# Patient Record
Sex: MALE | Race: WHITE
[De-identification: names, ages, dates, MRNs, and addresses within clinical notes are randomized per-mention and may not be internally consistent; named-entity substitution may affect disease eponyms.]

---

## 2017-01-18 NOTE — REP
CHEST X-RAY:

 

Two views.

 

HISTORY:  Cough.  Upper respiratory symptoms, tobacco use.  Dizziness.

 

FINDINGS:  The lungs are symmetrically aerated and free of infiltrate.  Pleural

angles are sharp.  Heart is not enlarged.  The aorta is slightly tortuous.  No

significant bony abnormality is seen.

 

IMPRESSION: No active disease.

 

 

Signed by

Darrell Alvarez MD 01/18/2017 08:09 P

## 2017-01-19 NOTE — ECGEPIP
Stationary ECG Study

                              [facility] [place]

                                       

                                       Test Date:    2017

Pat Name:     ROGELIO HEMPHILL            Department:   

Patient ID:   [id number]                 Room:         -

Gender:       M                        Technician:   [facility]

:          1960               Requested By: Noemi AYON

Order Number: [id number]     Reading MD:   Marcelino Stoddard

                                 Measurements

Intervals                              Axis          

Rate:         86                       P:            13

WA:           148                      QRS:          37

QRSD:         77                       T:            72

QT:           365                                    

QTc:          439                                    

                           Interpretive Statements

Normal sinus rhythm

Incomplete right bundle branch block

Delayed anterior R wave progression

Comparison tracing not on file

 

 

Electronically Signed On 2017 8:28:46 EST by Marcelino Stoddard

## 2018-04-02 ENCOUNTER — HOSPITAL ENCOUNTER (OUTPATIENT)
Dept: HOSPITAL 53 - M PT | Age: 58
LOS: 28 days | End: 2018-04-30
Attending: NURSE PRACTITIONER
Payer: MEDICAID

## 2018-04-02 DIAGNOSIS — M54.5: ICD-10-CM

## 2018-04-02 DIAGNOSIS — Z51.89: Primary | ICD-10-CM

## 2018-04-02 PROCEDURE — 97010 HOT OR COLD PACKS THERAPY: CPT

## 2018-04-30 ENCOUNTER — HOSPITAL ENCOUNTER (OUTPATIENT)
Dept: HOSPITAL 53 - M RAD | Age: 58
End: 2018-04-30
Attending: NURSE PRACTITIONER
Payer: MEDICAID

## 2018-04-30 DIAGNOSIS — M54.5: Primary | ICD-10-CM

## 2018-04-30 PROCEDURE — 72148 MRI LUMBAR SPINE W/O DYE: CPT

## 2018-06-13 ENCOUNTER — HOSPITAL ENCOUNTER (OUTPATIENT)
Dept: HOSPITAL 53 - M LAB REF | Age: 58
End: 2018-06-13
Attending: NURSE PRACTITIONER
Payer: COMMERCIAL

## 2018-06-13 DIAGNOSIS — Z13.9: Primary | ICD-10-CM

## 2018-06-13 LAB
ALBUMIN/GLOBULIN RATIO: 0.95 (ref 1–1.93)
ALBUMIN: 3.6 GM/DL (ref 3.2–5.2)
ALKALINE PHOSPHATASE: 101 U/L (ref 45–117)
ALT SERPL W P-5'-P-CCNC: 25 U/L (ref 12–78)
ANION GAP: 9 MEQ/L (ref 8–16)
AST SERPL-CCNC: 16 U/L (ref 7–37)
BASO #: 0.1 10^3/UL (ref 0–0.2)
BASO %: 0.8 % (ref 0–1)
BILIRUBIN,TOTAL: 0.3 MG/DL (ref 0.2–1)
BLOOD UREA NITROGEN: 10 MG/DL (ref 7–18)
CALCIUM LEVEL: 9.6 MG/DL (ref 8.5–10.1)
CARBON DIOXIDE LEVEL: 30 MEQ/L (ref 21–32)
CHLORIDE LEVEL: 105 MEQ/L (ref 98–107)
CHOLEST SERPL-MCNC: 225 MG/DL (ref ?–200)
CHOLESTEROL RISK RATIO: 5.11 (ref ?–5)
CREATININE FOR GFR: 0.94 MG/DL (ref 0.7–1.3)
EOS #: 0.1 10^3/UL (ref 0–0.5)
EOSINOPHIL NFR BLD AUTO: 1.6 % (ref 0–3)
EST. AVERAGE GLUCOSE BLD GHB EST-MCNC: 108 MG/DL (ref 60–110)
GFR SERPL CREATININE-BSD FRML MDRD: > 60 ML/MIN/{1.73_M2} (ref 56–?)
GLUCOSE, FASTING: 87 MG/DL (ref 70–100)
HDLC SERPL-MCNC: 44 MG/DL (ref 40–?)
HEMATOCRIT: 47.8 % (ref 42–52)
HEMOGLOBIN: 16.2 G/DL (ref 13.5–17.5)
IMMATURE GRANULOCYTE %: 0.3 % (ref 0–3)
LDL CHOLESTEROL: 144.4 MG/DL (ref ?–100)
LYMPH #: 2.5 10^3/UL (ref 1.5–4.5)
LYMPH %: 28.8 % (ref 24–44)
MEAN CORPUSCULAR HEMOGLOBIN: 31.3 PG (ref 27–33)
MEAN CORPUSCULAR HGB CONC: 33.9 G/DL (ref 32–36.5)
MEAN CORPUSCULAR VOLUME: 92.3 FL (ref 80–96)
MONO #: 1 10^3/UL (ref 0–0.8)
MONO %: 11.5 % (ref 0–5)
NEUTROPHILS #: 5 10^3/UL (ref 1.8–7.7)
NEUTROPHILS %: 57 % (ref 36–66)
NONHDLC SERPL-MCNC: 181 MG/DL
NRBC BLD AUTO-RTO: 0 % (ref 0–0)
PLATELET COUNT, AUTOMATED: 277 10^3/UL (ref 150–450)
POTASSIUM SERUM: 4.8 MEQ/L (ref 3.5–5.1)
PSA SERPL-MCNC: 2.24 NG/ML (ref ?–4)
RED BLOOD COUNT: 5.18 10^6/UL (ref 4.3–6.1)
RED CELL DISTRIBUTION WIDTH: 12.1 % (ref 11.5–14.5)
SODIUM LEVEL: 144 MEQ/L (ref 136–145)
TOTAL PROTEIN: 7.4 GM/DL (ref 6.4–8.2)
TRIGLYCERIDES LEVEL: 183 MG/DL (ref ?–150)
WHITE BLOOD COUNT: 8.8 10^3/UL (ref 4–10)

## 2018-06-19 ENCOUNTER — HOSPITAL ENCOUNTER (OUTPATIENT)
Dept: HOSPITAL 53 - M PT | Age: 58
LOS: 11 days | End: 2018-06-30
Attending: NURSE PRACTITIONER
Payer: COMMERCIAL

## 2018-06-19 DIAGNOSIS — Z51.89: Primary | ICD-10-CM

## 2018-06-19 DIAGNOSIS — M54.5: ICD-10-CM

## 2018-09-11 ENCOUNTER — HOSPITAL ENCOUNTER (OUTPATIENT)
Dept: HOSPITAL 53 - M LAB REF | Age: 58
End: 2018-09-11
Attending: NURSE PRACTITIONER
Payer: COMMERCIAL

## 2018-09-11 DIAGNOSIS — E78.5: Primary | ICD-10-CM

## 2018-09-11 LAB
CHOLEST SERPL-MCNC: 149 MG/DL (ref ?–200)
CHOLESTEROL RISK RATIO: 3.73 (ref ?–5)
HDLC SERPL-MCNC: 40 MG/DL (ref 40–?)
LDL CHOLESTEROL: 69 MG/DL (ref ?–100)
NONHDLC SERPL-MCNC: 109 MG/DL
TRIGLYCERIDES LEVEL: 199 MG/DL (ref ?–150)

## 2018-09-11 PROCEDURE — 80061 LIPID PANEL: CPT

## 2018-09-17 ENCOUNTER — HOSPITAL ENCOUNTER (OUTPATIENT)
Dept: HOSPITAL 53 - M LAB REF | Age: 58
End: 2018-09-17
Attending: NURSE PRACTITIONER
Payer: COMMERCIAL

## 2018-09-17 DIAGNOSIS — K30: Primary | ICD-10-CM

## 2018-09-17 DIAGNOSIS — K21.9: ICD-10-CM

## 2020-04-14 ENCOUNTER — HOSPITAL ENCOUNTER (OUTPATIENT)
Dept: HOSPITAL 53 - M LAB REF | Age: 60
End: 2020-04-14
Attending: NURSE PRACTITIONER
Payer: COMMERCIAL

## 2020-04-14 DIAGNOSIS — M54.5: ICD-10-CM

## 2020-04-14 DIAGNOSIS — K30: Primary | ICD-10-CM

## 2020-04-14 DIAGNOSIS — E78.5: ICD-10-CM

## 2020-04-14 DIAGNOSIS — Z12.5: ICD-10-CM

## 2020-04-14 DIAGNOSIS — Z72.0: ICD-10-CM

## 2020-04-14 LAB
25(OH)D3 SERPL-MCNC: 29.4 NG/ML (ref 30–100)
ALBUMIN SERPL BCG-MCNC: 3.7 GM/DL (ref 3.2–5.2)
ALT SERPL W P-5'-P-CCNC: 26 U/L (ref 12–78)
BASOPHILS # BLD AUTO: 0.1 10^3/UL (ref 0–0.2)
BASOPHILS NFR BLD AUTO: 0.5 % (ref 0–1)
BILIRUB SERPL-MCNC: 0.4 MG/DL (ref 0.2–1)
BUN SERPL-MCNC: 7 MG/DL (ref 7–18)
CALCIUM SERPL-MCNC: 9.3 MG/DL (ref 8.5–10.1)
CHLORIDE SERPL-SCNC: 104 MEQ/L (ref 98–107)
CHOLEST SERPL-MCNC: 209 MG/DL (ref ?–200)
CHOLEST/HDLC SERPL: 4.98 {RATIO} (ref ?–5)
CO2 SERPL-SCNC: 30 MEQ/L (ref 21–32)
CREAT SERPL-MCNC: 0.91 MG/DL (ref 0.7–1.3)
EOSINOPHIL # BLD AUTO: 0.1 10^3/UL (ref 0–0.5)
EOSINOPHIL NFR BLD AUTO: 1.5 % (ref 0–3)
EST. AVERAGE GLUCOSE BLD GHB EST-MCNC: 114 MG/DL (ref 60–110)
GFR SERPL CREATININE-BSD FRML MDRD: > 60 ML/MIN/{1.73_M2} (ref 56–?)
GLUCOSE SERPL-MCNC: 88 MG/DL (ref 70–100)
HCT VFR BLD AUTO: 48.3 % (ref 42–52)
HDLC SERPL-MCNC: 42 MG/DL (ref 40–?)
HGB BLD-MCNC: 16.2 G/DL (ref 13.5–17.5)
LDLC SERPL CALC-MCNC: 129 MG/DL (ref ?–100)
LYMPHOCYTES # BLD AUTO: 2.3 10^3/UL (ref 1.5–5)
LYMPHOCYTES NFR BLD AUTO: 25.1 % (ref 24–44)
MCH RBC QN AUTO: 30.5 PG (ref 27–33)
MCHC RBC AUTO-ENTMCNC: 33.5 G/DL (ref 32–36.5)
MCV RBC AUTO: 91 FL (ref 80–96)
MONOCYTES # BLD AUTO: 1 10^3/UL (ref 0–0.8)
MONOCYTES NFR BLD AUTO: 10.9 % (ref 0–5)
NEUTROPHILS # BLD AUTO: 5.6 10^3/UL (ref 1.5–8.5)
NEUTROPHILS NFR BLD AUTO: 61.8 % (ref 36–66)
NONHDLC SERPL-MCNC: 167 MG/DL
PLATELET # BLD AUTO: 248 10^3/UL (ref 150–450)
POTASSIUM SERPL-SCNC: 4.4 MEQ/L (ref 3.5–5.1)
PROT SERPL-MCNC: 7.5 GM/DL (ref 6.4–8.2)
RBC # BLD AUTO: 5.31 10^6/UL (ref 4.3–6.1)
SODIUM SERPL-SCNC: 139 MEQ/L (ref 136–145)
T4 FREE SERPL-MCNC: 1.21 NG/DL (ref 0.76–1.46)
TRIGL SERPL-MCNC: 189 MG/DL (ref ?–150)
TSH SERPL DL<=0.005 MIU/L-ACNC: 2.42 UIU/ML (ref 0.36–3.74)
WBC # BLD AUTO: 9.1 10^3/UL (ref 4–10)